# Patient Record
(demographics unavailable — no encounter records)

---

## 2024-10-29 NOTE — ASSESSMENT
[FreeTextEntry1] : 49yo F with left dorsal wrist ganglion s/p excision. Doing well.   Now with multiple lipomas b/l thighs.   10/26/2024 has several lipomas in lower extremities only one is sx--11cm superior to right patella lipoma approx 2cm   suggest office procedure for this one only (as only symptmoatic one)  Regarding the procedure, we discussed scarring, poor wound healing, bleeding, infection, need for additional surgery, and dissatisfaction with the outcome.  Also discussed possibility of keloid and/or hypertrophic scar formation as well as recurrence.  All questions were answered, and risks understood.  pt happy w plan  hold ASA for oen week prior approx 25 min total spent on encounter

## 2024-10-29 NOTE — PHYSICAL EXAM
[NI] : Normal [de-identified] : NAD, pleasant female [de-identified] : nonlabored breathing, [de-identified] : Left dorsal wrist incision healed FROM of the wrist  [de-identified] : Multiple small soft tissue subcutaneous lesions over bilateral anterior thighs slightly tender to palpation

## 2024-10-29 NOTE — HISTORY OF PRESENT ILLNESS
[FreeTextEntry1] : 48 yr old [place] [facility] [place]/p chemo/RT now with left dorsal wrist ganglion  RHD  no prior tx of let dorsal left wrist ganglion  Interval hx (2/24/23). Pt presents today 2 weeks s/p excision of left dorsal wrist ganglion cyst. Doing well with no significant pain, f/c or drainage.   Interval hx (3/24/34): Pt is 6 weeks s/p excision of left dorsal wrist ganglion cyst. Doing very well, using aquaphor daily. Denies any issues  Interval hx (10/29/24). Pt here for f/u. Left dorsal wrist fine with no complaints. Pt is here with a new c/o multiple soft tissue masses over b/l thighs for several months c/o pain and discomfort.

## 2024-12-06 NOTE — PROCEDURE
[FreeTextEntry6] : Patient is a 50 year old female with a right thigh lipoma measuring approximately 3x2 cm.    The area was prepped and draped in the usual fashion.  Local anesthetic was administered using 1% lidocaine with epinephrine.  The lipoma was sharply excised.  Area was irrigated copiously.  Complex wound closure was performed in layers.  The wound measured approximately 4 cm.  Sterile dressing applied.    Patient tolerated procedure well and understands post-op instructions.  Sutures Used: 3-0 monocryl

## 2024-12-19 NOTE — PHYSICAL EXAM
[NI] : Normal [de-identified] : NAD, pleasant female [de-identified] : nonlabored breathing, [de-identified] : Left dorsal wrist incision healed FROM of the wrist  [de-identified] : Right anterior thigh incision healing well, c/d/i, no erythema or fluid collection, good cosmesis  Multiple other small soft tissue subcutaneous lesions over bilateral anterior thighs slightly tender to palpation

## 2024-12-19 NOTE — DATA REVIEWED
[FreeTextEntry1] : Patient:     SLOAN HAMLIN   Accession:                             [id number]  Collected Date/Time:                   12/6/2024 13:31 EST Received Date/Time:                    12/9/2024 08:58 EST  Surgical Pathology Report - Auth (Verified)  Specimen(s) Submitted Right thigh lipoma  Final Diagnosis Soft tissue mass, right thigh, excision: - Mature adipose tissue, consistent with lipoma.  Verified by: Lynsey Crump M.D. (Electronic Signature) Reported on: 12/12/24 13:04 EST, [facility], [facility], [address] [place] [address] Histology technical [place] [address] Phone: [phone]   Fax: [phone]

## 2024-12-19 NOTE — ASSESSMENT
[FreeTextEntry1] : 49 yo F with left dorsal wrist ganglion s/p excision. Doing well.   Now with multiple lipomas b/l thighs 2 weeks s/p excision of right anterior thigh lipoma. Doing well.   - steri strips replaced - may shower, no submerging - daily Aquaphor - pathology discussed - lipoma, report given to pt - post-op instructions reviewed and all her questions were answered - f/u PRN

## 2024-12-19 NOTE — HISTORY OF PRESENT ILLNESS
[FreeTextEntry1] : 48 yr old woman with PMHx of Rectal cancer at [facility] [place] in [place], then in again in 2022 s/p chemo/RT now with left dorsal wrist ganglion  RHD  no prior tx of let dorsal left wrist ganglion  Interval hx (2/24/23). Pt presents today 2 weeks s/p excision of left dorsal wrist ganglion cyst. Doing well with no significant pain, f/c or drainage.   Interval hx (3/24/34): Pt is 6 weeks s/p excision of left dorsal wrist ganglion cyst. Doing very well, using aquaphor daily. Denies any issues  Interval hx (10/29/24). Pt here for f/u. Left dorsal wrist fine with no complaints. Pt is here with a new c/o multiple soft tissue masses over b/l thighs for several months c/o pain and discomfort.   Interval hx (12/19/24). Pt presents today 2 weeks s/p excision of right anterior thigh lipoma. Doing well with no significant pain, f/c or drainage.